# Patient Record
Sex: MALE | Race: BLACK OR AFRICAN AMERICAN | NOT HISPANIC OR LATINO | Employment: FULL TIME | URBAN - METROPOLITAN AREA
[De-identification: names, ages, dates, MRNs, and addresses within clinical notes are randomized per-mention and may not be internally consistent; named-entity substitution may affect disease eponyms.]

---

## 2024-03-13 ENCOUNTER — HOSPITAL ENCOUNTER (OUTPATIENT)
Facility: HOSPITAL | Age: 54
Discharge: HOME | End: 2024-03-14
Attending: STUDENT IN AN ORGANIZED HEALTH CARE EDUCATION/TRAINING PROGRAM | Admitting: STUDENT IN AN ORGANIZED HEALTH CARE EDUCATION/TRAINING PROGRAM
Payer: COMMERCIAL

## 2024-03-13 DIAGNOSIS — R06.02 SHORTNESS OF BREATH: ICD-10-CM

## 2024-03-13 DIAGNOSIS — R06.02 SOB (SHORTNESS OF BREATH): ICD-10-CM

## 2024-03-13 DIAGNOSIS — R00.0 TACHYCARDIA: ICD-10-CM

## 2024-03-13 DIAGNOSIS — T78.3XXA ANGIOEDEMA: Primary | ICD-10-CM

## 2024-03-13 PROBLEM — I10 ESSENTIAL HYPERTENSION: Status: ACTIVE | Noted: 2024-03-13

## 2024-03-13 PROBLEM — E11.9 TYPE 2 DIABETES MELLITUS, WITHOUT LONG-TERM CURRENT USE OF INSULIN: Status: ACTIVE | Noted: 2024-03-13

## 2024-03-13 LAB
ALBUMIN SERPL BCP-MCNC: 4.1 G/DL (ref 3.5–5.2)
ALP SERPL-CCNC: 90 U/L (ref 55–135)
ALT SERPL W/O P-5'-P-CCNC: 34 U/L (ref 10–44)
ANION GAP SERPL CALC-SCNC: 10 MMOL/L (ref 8–16)
AST SERPL-CCNC: 30 U/L (ref 10–40)
BASOPHILS # BLD AUTO: 0.03 K/UL (ref 0–0.2)
BASOPHILS NFR BLD: 0.3 % (ref 0–1.9)
BILIRUB SERPL-MCNC: 0.6 MG/DL (ref 0.1–1)
BUN SERPL-MCNC: 17 MG/DL (ref 6–20)
CALCIUM SERPL-MCNC: 10.3 MG/DL (ref 8.7–10.5)
CHLORIDE SERPL-SCNC: 105 MMOL/L (ref 95–110)
CO2 SERPL-SCNC: 21 MMOL/L (ref 23–29)
CREAT SERPL-MCNC: 1.3 MG/DL (ref 0.5–1.4)
DIFFERENTIAL METHOD BLD: ABNORMAL
EOSINOPHIL # BLD AUTO: 0.1 K/UL (ref 0–0.5)
EOSINOPHIL NFR BLD: 1 % (ref 0–8)
ERYTHROCYTE [DISTWIDTH] IN BLOOD BY AUTOMATED COUNT: 13.5 % (ref 11.5–14.5)
EST. GFR  (NO RACE VARIABLE): >60 ML/MIN/1.73 M^2
GLUCOSE SERPL-MCNC: 197 MG/DL (ref 70–110)
HCT VFR BLD AUTO: 47.3 % (ref 40–54)
HCV AB SERPL QL IA: NORMAL
HGB BLD-MCNC: 16 G/DL (ref 14–18)
HIV 1+2 AB+HIV1 P24 AG SERPL QL IA: NORMAL
IMM GRANULOCYTES # BLD AUTO: 0.04 K/UL (ref 0–0.04)
IMM GRANULOCYTES NFR BLD AUTO: 0.4 % (ref 0–0.5)
LYMPHOCYTES # BLD AUTO: 2.1 K/UL (ref 1–4.8)
LYMPHOCYTES NFR BLD: 20.9 % (ref 18–48)
MCH RBC QN AUTO: 26.4 PG (ref 27–31)
MCHC RBC AUTO-ENTMCNC: 33.8 G/DL (ref 32–36)
MCV RBC AUTO: 78 FL (ref 82–98)
MONOCYTES # BLD AUTO: 1 K/UL (ref 0.3–1)
MONOCYTES NFR BLD: 9.6 % (ref 4–15)
NEUTROPHILS # BLD AUTO: 6.8 K/UL (ref 1.8–7.7)
NEUTROPHILS NFR BLD: 67.8 % (ref 38–73)
NRBC BLD-RTO: 0 /100 WBC
OHS QRS DURATION: 86 MS
OHS QTC CALCULATION: 437 MS
PLATELET # BLD AUTO: 267 K/UL (ref 150–450)
PMV BLD AUTO: 9.8 FL (ref 9.2–12.9)
POCT GLUCOSE: 193 MG/DL (ref 70–110)
POCT GLUCOSE: 313 MG/DL (ref 70–110)
POTASSIUM SERPL-SCNC: 4.5 MMOL/L (ref 3.5–5.1)
PROT SERPL-MCNC: 8.6 G/DL (ref 6–8.4)
RBC # BLD AUTO: 6.05 M/UL (ref 4.6–6.2)
SODIUM SERPL-SCNC: 136 MMOL/L (ref 136–145)
TROPONIN I SERPL DL<=0.01 NG/ML-MCNC: 0.01 NG/ML (ref 0–0.03)
WBC # BLD AUTO: 10.07 K/UL (ref 3.9–12.7)

## 2024-03-13 PROCEDURE — 93010 ELECTROCARDIOGRAM REPORT: CPT | Mod: ,,, | Performed by: INTERNAL MEDICINE

## 2024-03-13 PROCEDURE — 82962 GLUCOSE BLOOD TEST: CPT

## 2024-03-13 PROCEDURE — 86803 HEPATITIS C AB TEST: CPT | Performed by: PHYSICIAN ASSISTANT

## 2024-03-13 PROCEDURE — 87389 HIV-1 AG W/HIV-1&-2 AB AG IA: CPT | Performed by: PHYSICIAN ASSISTANT

## 2024-03-13 PROCEDURE — 25000003 PHARM REV CODE 250: Performed by: PHYSICIAN ASSISTANT

## 2024-03-13 PROCEDURE — 25000003 PHARM REV CODE 250: Performed by: STUDENT IN AN ORGANIZED HEALTH CARE EDUCATION/TRAINING PROGRAM

## 2024-03-13 PROCEDURE — 63600175 PHARM REV CODE 636 W HCPCS: Performed by: PHYSICIAN ASSISTANT

## 2024-03-13 PROCEDURE — 99285 EMERGENCY DEPT VISIT HI MDM: CPT | Mod: 25

## 2024-03-13 PROCEDURE — 93005 ELECTROCARDIOGRAM TRACING: CPT

## 2024-03-13 PROCEDURE — 85025 COMPLETE CBC W/AUTO DIFF WBC: CPT | Performed by: STUDENT IN AN ORGANIZED HEALTH CARE EDUCATION/TRAINING PROGRAM

## 2024-03-13 PROCEDURE — G0378 HOSPITAL OBSERVATION PER HR: HCPCS

## 2024-03-13 PROCEDURE — 96375 TX/PRO/DX INJ NEW DRUG ADDON: CPT

## 2024-03-13 PROCEDURE — 63600175 PHARM REV CODE 636 W HCPCS: Performed by: STUDENT IN AN ORGANIZED HEALTH CARE EDUCATION/TRAINING PROGRAM

## 2024-03-13 PROCEDURE — 80053 COMPREHEN METABOLIC PANEL: CPT | Performed by: STUDENT IN AN ORGANIZED HEALTH CARE EDUCATION/TRAINING PROGRAM

## 2024-03-13 PROCEDURE — 84484 ASSAY OF TROPONIN QUANT: CPT | Performed by: STUDENT IN AN ORGANIZED HEALTH CARE EDUCATION/TRAINING PROGRAM

## 2024-03-13 PROCEDURE — 96374 THER/PROPH/DIAG INJ IV PUSH: CPT

## 2024-03-13 PROCEDURE — 96372 THER/PROPH/DIAG INJ SC/IM: CPT | Mod: 59 | Performed by: PHYSICIAN ASSISTANT

## 2024-03-13 PROCEDURE — 96361 HYDRATE IV INFUSION ADD-ON: CPT

## 2024-03-13 RX ORDER — ACETAMINOPHEN 500 MG
1000 TABLET ORAL EVERY 8 HOURS PRN
Status: DISCONTINUED | OUTPATIENT
Start: 2024-03-13 | End: 2024-03-14 | Stop reason: HOSPADM

## 2024-03-13 RX ORDER — IPRATROPIUM BROMIDE AND ALBUTEROL SULFATE 2.5; .5 MG/3ML; MG/3ML
3 SOLUTION RESPIRATORY (INHALATION) EVERY 4 HOURS PRN
Status: DISCONTINUED | OUTPATIENT
Start: 2024-03-13 | End: 2024-03-14 | Stop reason: HOSPADM

## 2024-03-13 RX ORDER — SODIUM CHLORIDE 0.9 % (FLUSH) 0.9 %
5 SYRINGE (ML) INJECTION
Status: DISCONTINUED | OUTPATIENT
Start: 2024-03-13 | End: 2024-03-14 | Stop reason: HOSPADM

## 2024-03-13 RX ORDER — IBUPROFEN 200 MG
24 TABLET ORAL
Status: DISCONTINUED | OUTPATIENT
Start: 2024-03-13 | End: 2024-03-14 | Stop reason: HOSPADM

## 2024-03-13 RX ORDER — BISACODYL 10 MG/1
10 SUPPOSITORY RECTAL DAILY PRN
Status: DISCONTINUED | OUTPATIENT
Start: 2024-03-13 | End: 2024-03-14 | Stop reason: HOSPADM

## 2024-03-13 RX ORDER — AMLODIPINE BESYLATE 10 MG/1
10 TABLET ORAL DAILY
Status: DISCONTINUED | OUTPATIENT
Start: 2024-03-13 | End: 2024-03-14

## 2024-03-13 RX ORDER — ROSUVASTATIN CALCIUM 40 MG/1
40 TABLET, COATED ORAL DAILY
COMMUNITY
Start: 2024-02-25

## 2024-03-13 RX ORDER — INSULIN ASPART 100 [IU]/ML
0-5 INJECTION, SOLUTION INTRAVENOUS; SUBCUTANEOUS
Status: DISCONTINUED | OUTPATIENT
Start: 2024-03-13 | End: 2024-03-14 | Stop reason: HOSPADM

## 2024-03-13 RX ORDER — GLUCAGON 1 MG
1 KIT INJECTION
Status: DISCONTINUED | OUTPATIENT
Start: 2024-03-13 | End: 2024-03-14 | Stop reason: HOSPADM

## 2024-03-13 RX ORDER — DIPHENHYDRAMINE HYDROCHLORIDE 50 MG/ML
25 INJECTION INTRAMUSCULAR; INTRAVENOUS
Status: COMPLETED | OUTPATIENT
Start: 2024-03-13 | End: 2024-03-13

## 2024-03-13 RX ORDER — ONDANSETRON 8 MG/1
8 TABLET, ORALLY DISINTEGRATING ORAL EVERY 8 HOURS PRN
Status: DISCONTINUED | OUTPATIENT
Start: 2024-03-13 | End: 2024-03-14 | Stop reason: HOSPADM

## 2024-03-13 RX ORDER — ACETAMINOPHEN 325 MG/1
650 TABLET ORAL EVERY 4 HOURS PRN
Status: DISCONTINUED | OUTPATIENT
Start: 2024-03-13 | End: 2024-03-14 | Stop reason: HOSPADM

## 2024-03-13 RX ORDER — SIMETHICONE 80 MG
1 TABLET,CHEWABLE ORAL 4 TIMES DAILY PRN
Status: DISCONTINUED | OUTPATIENT
Start: 2024-03-13 | End: 2024-03-14 | Stop reason: HOSPADM

## 2024-03-13 RX ORDER — ALUMINUM HYDROXIDE, MAGNESIUM HYDROXIDE, AND SIMETHICONE 1200; 120; 1200 MG/30ML; MG/30ML; MG/30ML
30 SUSPENSION ORAL 4 TIMES DAILY PRN
Status: DISCONTINUED | OUTPATIENT
Start: 2024-03-13 | End: 2024-03-14 | Stop reason: HOSPADM

## 2024-03-13 RX ORDER — IBUPROFEN 200 MG
16 TABLET ORAL
Status: DISCONTINUED | OUTPATIENT
Start: 2024-03-13 | End: 2024-03-14 | Stop reason: HOSPADM

## 2024-03-13 RX ORDER — METFORMIN HYDROCHLORIDE 500 MG/1
500 TABLET ORAL 2 TIMES DAILY WITH MEALS
COMMUNITY
Start: 2024-02-25

## 2024-03-13 RX ORDER — DEXAMETHASONE SODIUM PHOSPHATE 4 MG/ML
10 INJECTION, SOLUTION INTRA-ARTICULAR; INTRALESIONAL; INTRAMUSCULAR; INTRAVENOUS; SOFT TISSUE
Status: COMPLETED | OUTPATIENT
Start: 2024-03-13 | End: 2024-03-13

## 2024-03-13 RX ORDER — PROCHLORPERAZINE EDISYLATE 5 MG/ML
5 INJECTION INTRAMUSCULAR; INTRAVENOUS EVERY 6 HOURS PRN
Status: DISCONTINUED | OUTPATIENT
Start: 2024-03-13 | End: 2024-03-14 | Stop reason: HOSPADM

## 2024-03-13 RX ORDER — TALC
6 POWDER (GRAM) TOPICAL NIGHTLY PRN
Status: DISCONTINUED | OUTPATIENT
Start: 2024-03-13 | End: 2024-03-14 | Stop reason: HOSPADM

## 2024-03-13 RX ORDER — AMLODIPINE BESYLATE 10 MG/1
10 TABLET ORAL DAILY
Status: ON HOLD | COMMUNITY
Start: 2024-02-25 | End: 2024-03-14 | Stop reason: HOSPADM

## 2024-03-13 RX ORDER — FAMOTIDINE 10 MG/ML
20 INJECTION INTRAVENOUS
Status: COMPLETED | OUTPATIENT
Start: 2024-03-13 | End: 2024-03-13

## 2024-03-13 RX ADMIN — SODIUM CHLORIDE 1000 ML: 9 INJECTION, SOLUTION INTRAVENOUS at 07:03

## 2024-03-13 RX ADMIN — AMLODIPINE BESYLATE 10 MG: 10 TABLET ORAL at 10:03

## 2024-03-13 RX ADMIN — INSULIN ASPART 2 UNITS: 100 INJECTION, SOLUTION INTRAVENOUS; SUBCUTANEOUS at 09:03

## 2024-03-13 RX ADMIN — DIPHENHYDRAMINE HYDROCHLORIDE 25 MG: 50 INJECTION, SOLUTION INTRAMUSCULAR; INTRAVENOUS at 07:03

## 2024-03-13 RX ADMIN — FAMOTIDINE 20 MG: 10 INJECTION, SOLUTION INTRAVENOUS at 07:03

## 2024-03-13 RX ADMIN — DEXAMETHASONE SODIUM PHOSPHATE 10 MG: 4 INJECTION INTRA-ARTICULAR; INTRALESIONAL; INTRAMUSCULAR; INTRAVENOUS; SOFT TISSUE at 07:03

## 2024-03-13 NOTE — H&P
"[facility]  History & Physical    Patient Name: Abram Carrasco  MRN: [id number]  Patient Class: OP- Observation  Admission Date: 3/13/2024  Attending Physician: Manuel Fox MD   Primary Care Provider: [facility]         Patient information was obtained from patient, past medical records, and ER records.     Subjective:     Principal Problem:Angioedema    Chief Complaint:   Chief Complaint   Patient presents with    Facial Swelling     Patient reports that he takes lisinopril. Patient reports that he is having SOB. Patient states that he noticed the swelling last night, but states that is worsened today.        HPI: Patient is a 53 year old male with history of HTN and DM2 being admitted to observation for angioedema. Patient presents with lower lip swelling that began this morning. He says it was initially to the lower right corner of his lip and progressively worsened to the entire lower lip. No trouble speaking, swallowing. Can eat and drink. Says he sometimes felt SOB but not "really." Took a benadryl prior to arrival. No nausea, vomiting or abdominal pain, or diarrhea. No recent medication changes, diet changes. No rash. No new habits/foods, etc. No family history of angioedema. He does take lisinopril and has for years.    In the ED, /102. Intake labs without acute abnormality. Patient received Dexamethasone 10mg IV, Benadryl 25mg IV, Famotidine 20mg IV, and 1L NS.    No past medical history on file.    No past surgical history on file.    Review of patient's allergies indicates:   Allergen Reactions    Lisinopril Swelling     Angioedema       No current facility-administered medications on file prior to encounter.     Current Outpatient Medications on File Prior to Encounter   Medication Sig    amLODIPine (NORVASC) 10 MG tablet Take 10 mg by mouth once daily.    metFORMIN (GLUCOPHAGE) 500 MG tablet Take 500 mg by mouth 2 (two) times daily with meals. "    rosuvastatin (CRESTOR) 40 MG Tab Take 40 mg by mouth once daily.     Family History    None       Tobacco Use    Smoking status: Not on file    Smokeless tobacco: Not on file   Substance and Sexual Activity    Alcohol use: Not on file    Drug use: Not on file    Sexual activity: Not on file     Review of Systems   Constitutional:  Negative for activity change, chills and fever.   HENT:  Positive for facial swelling. Negative for trouble swallowing.    Eyes:  Negative for photophobia and visual disturbance.   Respiratory:  Negative for chest tightness, shortness of breath and wheezing.    Cardiovascular:  Negative for chest pain, palpitations and leg swelling.   Gastrointestinal:  Negative for abdominal pain, constipation, diarrhea, nausea and vomiting.   Genitourinary:  Negative for dysuria, frequency, hematuria and urgency.   Musculoskeletal:  Negative for arthralgias, back pain and gait problem.   Skin:  Negative for color change and rash.   Neurological:  Negative for dizziness, syncope, weakness, light-headedness, numbness and headaches.   Psychiatric/Behavioral:  Negative for agitation and confusion. The patient is not nervous/anxious.      Objective:     Vital Signs (Most Recent):  Temp: 98.4 °F (36.9 °C) (03/13/24 0629)  Pulse: 76 (03/13/24 0832)  Resp: 18 (03/13/24 0832)  BP: (!) 160/90 (03/13/24 0832)  SpO2: 98 % (03/13/24 0832) Vital Signs (24h Range):  Temp:  [98.4 °F (36.9 °C)] 98.4 °F (36.9 °C)  Pulse:  [] 76  Resp:  [18] 18  SpO2:  [98 %-99 %] 98 %  BP: (138-160)/() 160/90     Weight: 97.5 kg (215 lb)  Body mass index is 33.67 kg/m².     Physical Exam  Vitals and nursing note reviewed.   Constitutional:       General: He is not in acute distress.     Appearance: He is well-developed.   HENT:      Head: Normocephalic and atraumatic.      Mouth/Throat:      Mouth: Angioedema present.      Pharynx: No oropharyngeal exudate.   Eyes:      Conjunctiva/sclera: Conjunctivae normal.      Pupils:  Pupils are equal, round, and reactive to light.   Cardiovascular:      Rate and Rhythm: Normal rate and regular rhythm.      Heart sounds: Normal heart sounds.   Pulmonary:      Effort: Pulmonary effort is normal. No respiratory distress.      Breath sounds: Normal breath sounds. No wheezing.   Abdominal:      General: Bowel sounds are normal. There is no distension.      Palpations: Abdomen is soft.      Tenderness: There is no abdominal tenderness.   Musculoskeletal:         General: No tenderness. Normal range of motion.      Cervical back: Normal range of motion and neck supple.   Lymphadenopathy:      Cervical: No cervical adenopathy.   Skin:     General: Skin is warm and dry.      Capillary Refill: Capillary refill takes less than 2 seconds.      Findings: No rash.   Neurological:      Mental Status: He is alert and oriented to person, place, and time.      Cranial Nerves: No cranial nerve deficit.      Sensory: No sensory deficit.      Coordination: Coordination normal.   Psychiatric:         Behavior: Behavior normal.         Thought Content: Thought content normal.         Judgment: Judgment normal.              CRANIAL NERVES     CN III, IV, VI   Pupils are equal, round, and reactive to light.       Significant Labs: All pertinent labs within the past 24 hours have been reviewed.  BMP:   Recent Labs   Lab 03/13/24  0738   *      K 4.5      CO2 21*   BUN 17   CREATININE 1.3   CALCIUM 10.3     CBC:   Recent Labs   Lab 03/13/24  0738   WBC 10.07   HGB 16.0   HCT 47.3          Significant Imaging: I have reviewed all pertinent imaging results/findings within the past 24 hours.  Assessment/Plan:     * Angioedema  Patient presents with acute onset of lip swelling. History of ACE usage.  - stop Lisinopril, added to allergy list, discussed with patient  - given Dexamethasone 10mg IV, Benadryl 25mg, and Famotidine 20  - monitor for improvement  - can give prednisone tomorrow if  necessary  - monitor BG in setting of DM2      Type 2 diabetes mellitus, without long-term current use of insulin  - glucose 197 on admission  - hold home Metformin  - diabetic diet  - low dose SSI in setting of steroid usage    Essential hypertension  Chronic, uncontrolled. Latest blood pressure and vitals reviewed-     Temp:  [98.4 °F (36.9 °C)]   Pulse:  []   Resp:  [18]   BP: (138-160)/()   SpO2:  [98 %-99 %] .   Home meds for hypertension were reviewed and noted below.   Hypertension Medications               amLODIPine (NORVASC) 10 MG tablet Take 10 mg by mouth once daily.            While in the hospital, will manage blood pressure as follows; Continue home antihypertensive regimen    Will utilize p.r.n. blood pressure medication only if patient's blood pressure greater than 180/110 and he develops symptoms such as worsening chest pain or shortness of breath.  - STOP Lisinopril      VTE Risk Mitigation (From admission, onward)           Ordered     IP VTE LOW RISK PATIENT  Once         03/13/24 0900     Place sequential compression device  Until discontinued         03/13/24 0900                         On 03/13/2024, patient should be placed in hospital observation services under my care in collaboration with Dr. Manuel Fox.           Storm Regan PA-C  Department of Hospital Medicine  [facility] - [facility] Dept

## 2024-03-13 NOTE — ED NOTES
Pt. Rounded on. Pain addressed, patient in no active pain. Patient repositioned in bed. Pt. Resting comfortably in bed with both side rails up. Pt. Has call bell and all belongings in reach at bedside. Patient asked if they needed to go to the restroom. Patient updated on treatment plan, patient verbalized understanding. Will continue to monitor patient at this time.

## 2024-03-13 NOTE — ED NOTES
Report received from Suyapa Carl RN. Assume care of pt. Pt resting comfortably and independently repositioned in stretcher with bed locked in lowest position for safety. NAD noted at this time. Respirations even and unlabored and visible chest rise noted.  Patient offered bathroom assistance and denies need at this time. Pt instructed to call if assistance is needed. Pt on continuous cardiac, BP, and O2 monitoring. Call light within reach. Family at bedside. No needs at this time. Will continue to monitor.

## 2024-03-13 NOTE — ED NOTES
Pt placed on cardiac monitor, continuous pulse ox, cycling blood pressures. Side rails up x2, call bell in reach, bed in low position with brake engaged.  AAOX4, skin w/d resp wnl- offers no c/o's at this time. Lower lip remains swollen, no  resp distress noted or difficulty swallowing.  IV NS infusing at bolus rate- site asymptomatic, Call bell in reach    LOC: The patient is awake and alert; oriented x 3 and speaking appropriately.  APPEARANCE: Patient resting comfortably, patient is clean and well groomed  SKIN: warm and dry, normal skin turgor & moist mucus membranes, skin intact, no breakdown noted.Lower lip swollen  MUSCULOSKELETAL: Patient moving all extremities well, no obvious swelling or deformities noted  RESPIRATORY: Airway is open and patent,  respirations are spontaneous, normal effort and rate  CARDIAC: Patient has a normal rate, no peripheral edema noted, capillary refill < 3 seconds; No complaints of chest pain   ABDOMEN: Soft and non tender to palpation, no distention noted. Bowel sounds present x 4

## 2024-03-13 NOTE — HPI
"Patient is a 53 year old male with history of HTN and DM2 being admitted to observation for angioedema. Patient presents with lower lip swelling that began this morning. He says it was initially to the lower right corner of his lip and progressively worsened to the entire lower lip. No trouble speaking, swallowing. Can eat and drink. Says he sometimes felt SOB but not "really." Took a benadryl prior to arrival. No nausea, vomiting or abdominal pain, or diarrhea. No recent medication changes, diet changes. No rash. No new habits/foods, etc. No family history of angioedema. He does take lisinopril and has for years.    In the ED, /102. Intake labs without acute abnormality. Patient received Dexamethasone 10mg IV, Benadryl 25mg IV, Famotidine 20mg IV, and 1L NS.  "

## 2024-03-13 NOTE — SUBJECTIVE & OBJECTIVE
No past medical history on file.    No past surgical history on file.    Review of patient's allergies indicates:   Allergen Reactions    Lisinopril Swelling     Angioedema       No current facility-administered medications on file prior to encounter.     Current Outpatient Medications on File Prior to Encounter   Medication Sig    amLODIPine (NORVASC) 10 MG tablet Take 10 mg by mouth once daily.    metFORMIN (GLUCOPHAGE) 500 MG tablet Take 500 mg by mouth 2 (two) times daily with meals.    rosuvastatin (CRESTOR) 40 MG Tab Take 40 mg by mouth once daily.     Family History    None       Tobacco Use    Smoking status: Not on file    Smokeless tobacco: Not on file   Substance and Sexual Activity    Alcohol use: Not on file    Drug use: Not on file    Sexual activity: Not on file     Review of Systems   Constitutional:  Negative for activity change, chills and fever.   HENT:  Positive for facial swelling. Negative for trouble swallowing.    Eyes:  Negative for photophobia and visual disturbance.   Respiratory:  Negative for chest tightness, shortness of breath and wheezing.    Cardiovascular:  Negative for chest pain, palpitations and leg swelling.   Gastrointestinal:  Negative for abdominal pain, constipation, diarrhea, nausea and vomiting.   Genitourinary:  Negative for dysuria, frequency, hematuria and urgency.   Musculoskeletal:  Negative for arthralgias, back pain and gait problem.   Skin:  Negative for color change and rash.   Neurological:  Negative for dizziness, syncope, weakness, light-headedness, numbness and headaches.   Psychiatric/Behavioral:  Negative for agitation and confusion. The patient is not nervous/anxious.      Objective:     Vital Signs (Most Recent):  Temp: 98.4 °F (36.9 °C) (03/13/24 0629)  Pulse: 76 (03/13/24 0832)  Resp: 18 (03/13/24 0832)  BP: (!) 160/90 (03/13/24 0832)  SpO2: 98 % (03/13/24 0832) Vital Signs (24h Range):  Temp:  [98.4 °F (36.9 °C)] 98.4 °F (36.9 °C)  Pulse:  []  76  Resp:  [18] 18  SpO2:  [98 %-99 %] 98 %  BP: (138-160)/() 160/90     Weight: 97.5 kg (215 lb)  Body mass index is 33.67 kg/m².     Physical Exam  Vitals and nursing note reviewed.   Constitutional:       General: He is not in acute distress.     Appearance: He is well-developed.   HENT:      Head: Normocephalic and atraumatic.      Mouth/Throat:      Mouth: Angioedema present.      Pharynx: No oropharyngeal exudate.   Eyes:      Conjunctiva/sclera: Conjunctivae normal.      Pupils: Pupils are equal, round, and reactive to light.   Cardiovascular:      Rate and Rhythm: Normal rate and regular rhythm.      Heart sounds: Normal heart sounds.   Pulmonary:      Effort: Pulmonary effort is normal. No respiratory distress.      Breath sounds: Normal breath sounds. No wheezing.   Abdominal:      General: Bowel sounds are normal. There is no distension.      Palpations: Abdomen is soft.      Tenderness: There is no abdominal tenderness.   Musculoskeletal:         General: No tenderness. Normal range of motion.      Cervical back: Normal range of motion and neck supple.   Lymphadenopathy:      Cervical: No cervical adenopathy.   Skin:     General: Skin is warm and dry.      Capillary Refill: Capillary refill takes less than 2 seconds.      Findings: No rash.   Neurological:      Mental Status: He is alert and oriented to person, place, and time.      Cranial Nerves: No cranial nerve deficit.      Sensory: No sensory deficit.      Coordination: Coordination normal.   Psychiatric:         Behavior: Behavior normal.         Thought Content: Thought content normal.         Judgment: Judgment normal.              CRANIAL NERVES     CN III, IV, VI   Pupils are equal, round, and reactive to light.       Significant Labs: All pertinent labs within the past 24 hours have been reviewed.  BMP:   Recent Labs   Lab 03/13/24  0738   *      K 4.5      CO2 21*   BUN 17   CREATININE 1.3   CALCIUM 10.3     CBC:    Recent Labs   Lab 03/13/24  0738   WBC 10.07   HGB 16.0   HCT 47.3          Significant Imaging: I have reviewed all pertinent imaging results/findings within the past 24 hours.

## 2024-03-13 NOTE — ASSESSMENT & PLAN NOTE
Chronic, uncontrolled. Latest blood pressure and vitals reviewed-     Temp:  [98.4 °F (36.9 °C)]   Pulse:  []   Resp:  [18]   BP: (138-160)/()   SpO2:  [98 %-99 %] .   Home meds for hypertension were reviewed and noted below.   Hypertension Medications               amLODIPine (NORVASC) 10 MG tablet Take 10 mg by mouth once daily.            While in the hospital, will manage blood pressure as follows; Continue home antihypertensive regimen    Will utilize p.r.n. blood pressure medication only if patient's blood pressure greater than 180/110 and he develops symptoms such as worsening chest pain or shortness of breath.  - STOP Lisinopril

## 2024-03-13 NOTE — ASSESSMENT & PLAN NOTE
- glucose 197 on admission  - hold home Metformin  - diabetic diet  - low dose SSI in setting of steroid usage

## 2024-03-13 NOTE — ED PROVIDER NOTES
"Encounter Date: 3/13/2024       History     Chief Complaint   Patient presents with    Facial Swelling     Patient reports that he takes lisinopril. Patient reports that he is having SOB. Patient states that he noticed the swelling last night, but states that is worsened today.     53 year old male with history of HTN presents with lower lip swelling that began this morning. He says it was initially to the lower right corner of his lip and progressively worsened to the entire lower lip. No trouble speaking, swallowing. Can eat and drink. Says he sometimes felt SOB but not "really." Took a benadryl prior to arrival. No nausea, vomiting or abdominal pain, or diarrhea. No recent medication changes, diet changes. No rash. No new habits/foods, etc. No family history of angioedema.       Review of patient's allergies indicates:   Allergen Reactions    Lisinopril Swelling     Angioedema     No past medical history on file.  No past surgical history on file.  No family history on file.     Review of Systems    Physical Exam     Initial Vitals [03/13/24 0629]   BP Pulse Resp Temp SpO2   (!) 138/102 107 18 98.4 °F (36.9 °C) 99 %      MAP       --         Physical Exam    Nursing note and vitals reviewed.  Constitutional: He appears well-developed and well-nourished.   HENT:   Head: Normocephalic and atraumatic.   Lower lip angioedema, uvula is midline. Phonating.    Eyes: EOM are normal. Pupils are equal, round, and reactive to light.   Neck: Neck supple.   Normal range of motion.  Cardiovascular:  Normal rate and regular rhythm.           Pulmonary/Chest: Breath sounds normal. No respiratory distress.   Abdominal: Abdomen is soft. There is no abdominal tenderness.   Musculoskeletal:         General: No tenderness or edema. Normal range of motion.      Cervical back: Normal range of motion and neck supple.     Neurological: He is alert and oriented to person, place, and time. GCS score is 15. GCS eye subscore is 4. GCS verbal " subscore is 5. GCS motor subscore is 6.   Skin: Skin is warm and dry. Capillary refill takes less than 2 seconds.   Psychiatric: He has a normal mood and affect. Thought content normal.         ED Course   Procedures  Labs Reviewed   CBC W/ AUTO DIFFERENTIAL - Abnormal; Notable for the following components:       Result Value    MCV 78 (*)     MCH 26.4 (*)     All other components within normal limits    Narrative:     Release to patient->Immediate   COMPREHENSIVE METABOLIC PANEL - Abnormal; Notable for the following components:    CO2 21 (*)     Glucose 197 (*)     Total Protein 8.6 (*)     All other components within normal limits    Narrative:     Release to patient->Immediate   POCT GLUCOSE - Abnormal; Notable for the following components:    POCT Glucose 193 (*)     All other components within normal limits   HIV 1 / 2 ANTIBODY    Narrative:     Release to patient->Immediate   HEPATITIS C ANTIBODY    Narrative:     Release to patient->Immediate   TROPONIN I    Narrative:     Release to patient->Immediate   POCT GLUCOSE MONITORING CONTINUOUS        ECG Results              EKG 12-lead (Final result)        Collection Time Result Time QRS Duration OHS QTC Calculation    03/13/24 06:33:09 03/13/24 09:55:30 86 437                     Final result by Interface, Lab In [facility] (03/13/24 09:55:34)                   Narrative:    Test Reason : R06.02,    Vent. Rate : 096 BPM     Atrial Rate : 096 BPM     P-R Int : 146 ms          QRS Dur : 086 ms      QT Int : 346 ms       P-R-T Axes : 067 -19 014 degrees     QTc Int : 437 ms    Normal sinus rhythm  Nonspecific T wave abnormality  Abnormal ECG  When compared with ECG of 03-JAN-2005 16:09,  No significant change was found  Confirmed by Pankaj VERGARA MD ([id number]) on 3/13/2024 9:55:28 AM    Referred By: JOHNNIE   SELF           Confirmed By:Pankaj VERGARA MD                                  Imaging Results              X-Ray Chest AP Portable (Final result)  Result time 03/13/24  "08:52:43      Final result by Elver Dao MD (03/13/24 08:52:43)                   Impression:      No acute cardiopulmonary finding identified on this single view.      Electronically signed by: Elver Dao MD  Date:    03/13/2024  Time:    08:52               Narrative:    EXAMINATION:  XR CHEST AP PORTABLE    CLINICAL HISTORY:  Provided history is "  Shortness of breath".    TECHNIQUE:  One view of the chest.    COMPARISON:  01/03/2005.    FINDINGS:  Cardiac wires overlie the chest.  Cardiac silhouette is not enlarged.  No focal consolidation.  No sizable pleural effusion.  No pneumothorax.                                       Medications   sodium chloride 0.9% flush 5 mL (has no administration in time range)   albuterol-ipratropium 2.5 mg-0.5 mg/3 mL nebulizer solution 3 mL (has no administration in time range)   melatonin tablet 6 mg (has no administration in time range)   ondansetron disintegrating tablet 8 mg (has no administration in time range)   prochlorperazine injection Soln 5 mg (has no administration in time range)   bisacodyL suppository 10 mg (has no administration in time range)   simethicone chewable tablet 80 mg (has no administration in time range)   aluminum-magnesium hydroxide-simethicone 200-200-20 mg/5 mL suspension 30 mL (has no administration in time range)   acetaminophen tablet 650 mg (has no administration in time range)   acetaminophen tablet 1,000 mg (has no administration in time range)   amLODIPine tablet 10 mg (10 mg Oral Given 3/13/24 1011)   glucose chewable tablet 16 g (has no administration in time range)   glucose chewable tablet 24 g (has no administration in time range)   glucagon (human recombinant) injection 1 mg (has no administration in time range)   insulin aspart U-100 pen 0-5 Units (0 Units Subcutaneous Not Given 3/13/24 1013)   dexAMETHasone injection 10 mg (10 mg Intravenous Given 3/13/24 0750)   diphenhydrAMINE injection 25 mg (25 mg Intravenous Given " 3/13/24 0750)   famotidine (PF) injection 20 mg (20 mg Intravenous Given 3/13/24 0750)   sodium chloride 0.9% bolus 1,000 mL 1,000 mL (0 mLs Intravenous Stopped 3/13/24 0849)     Medical Decision Making  Hemodynamically stable. Afebrile. Phonating and protecting the airway spontaneously. No clinical evidence for cardiovascular instability or impending airway compromise. Examination as above. Prior medical records reviewed. PMD note reviewed, hx of DM/HTN. Current co-morbidities considered that will impact clinical decision making include as above.    Plan:  Labs, supportive care, admission.       Amount and/or Complexity of Data Reviewed  Labs: ordered.  Radiology: ordered.    Risk  Prescription drug management.                        Labs reviewed.  CBC negative.  CMP negative.  Will admit to hospital medicine for further evaluation observation.              Clinical Impression:  Final diagnoses:  [R06.02] Shortness of breath  [R06.02] SOB (shortness of breath)  [T78.3XXA] Angioedema          ED Disposition Condition    Observation                 Rickey Hartmann MD  03/13/24 1856

## 2024-03-13 NOTE — ASSESSMENT & PLAN NOTE
Patient presents with acute onset of lip swelling. History of ACE usage.  - stop Lisinopril, added to allergy list, discussed with patient  - given Dexamethasone 10mg IV, Benadryl 25mg, and Famotidine 20  - monitor for improvement  - can give prednisone tomorrow if necessary  - monitor BG in setting of DM2

## 2024-03-13 NOTE — ED NOTES
Patient identifiers for Abram Carrasco 53 y.o. male checked and correct.  Chief Complaint   Patient presents with    Facial Swelling     Patient reports that he takes lisinopril. Patient reports that he is having SOB. Patient states that he noticed the swelling last night, but states that is worsened today.     No past medical history on file.  Allergies reported: Review of patient's allergies indicates:  Not on File    Pt awoke at 3am with lip selling, takes 10mg Lisinopril once a day for 10 years.     LOC: Patient is awake, alert, and aware of environment with an appropriate affect. Patient is oriented x 4 and speaking appropriately.  APPEARANCE: Patient resting comfortably and in no acute distress. Patient is clean and well groomed, patient's clothing is properly fastened.  HEENT: swelling noted to bottom lip only  SKIN: The skin is warm and dry. Patient has normal skin turgor and moist mucus membranes.   MUSKULOSKELETAL: Patient is moving all extremities well, no obvious deformities noted. Pulses intact.   RESPIRATORY: Airway is open and patent. Respirations are spontaneous and non-labored with normal effort and rate.  CARDIAC: Patient tachycardic, 107 on cardiac monitor. No peripheral edema noted.   ABDOMEN: No distention noted. Soft and non-tender upon palpation.  NEUROLOGICAL: pupils 3mm, PERRL. Facial expression is symmetrical. Hand grasps are equal bilaterally. Normal sensation in all extremities when touched with finger.

## 2024-03-13 NOTE — PHARMACY MED REC
"    Admission Medication History     The home medication history was taken by Batool Murcia.    You may go to "Admission" then "Reconcile Home Medications" tabs to review and/or act upon these items.     The home medication list has been updated by the Pharmacy department.   Please read ALL comments highlighted in yellow.   Please address this information as you see fit.    Feel free to contact us if you have any questions or require assistance.        Medications listed below were obtained from: Patient/family and Analytic software- uiu  Current Outpatient Medications on File Prior to Encounter   Medication Sig    amLODIPine (NORVASC) 10 MG tablet   Take 10 mg by mouth once daily.    metFORMIN (GLUCOPHAGE) 500 MG tablet     Take 500 mg by mouth 2 (two) times daily with meals.    rosuvastatin (CRESTOR) 40 MG Tab   Take 40 mg by mouth once daily.         Batool Murcia  EXT [id number]            .          "

## 2024-03-14 VITALS
RESPIRATION RATE: 16 BRPM | DIASTOLIC BLOOD PRESSURE: 93 MMHG | SYSTOLIC BLOOD PRESSURE: 157 MMHG | HEIGHT: 67 IN | OXYGEN SATURATION: 97 % | TEMPERATURE: 98 F | WEIGHT: 215 LBS | BODY MASS INDEX: 33.74 KG/M2 | HEART RATE: 103 BPM

## 2024-03-14 LAB
OHS QRS DURATION: 84 MS
OHS QRS DURATION: 84 MS
OHS QTC CALCULATION: 442 MS
OHS QTC CALCULATION: 445 MS
POCT GLUCOSE: 203 MG/DL (ref 70–110)
POCT GLUCOSE: 220 MG/DL (ref 70–110)
POCT GLUCOSE: 323 MG/DL (ref 70–110)

## 2024-03-14 PROCEDURE — 96372 THER/PROPH/DIAG INJ SC/IM: CPT | Performed by: PHYSICIAN ASSISTANT

## 2024-03-14 PROCEDURE — 93010 ELECTROCARDIOGRAM REPORT: CPT | Mod: ,,, | Performed by: INTERNAL MEDICINE

## 2024-03-14 PROCEDURE — 93005 ELECTROCARDIOGRAM TRACING: CPT

## 2024-03-14 PROCEDURE — G0378 HOSPITAL OBSERVATION PER HR: HCPCS

## 2024-03-14 PROCEDURE — 25000003 PHARM REV CODE 250

## 2024-03-14 PROCEDURE — 63600175 PHARM REV CODE 636 W HCPCS: Performed by: PHYSICIAN ASSISTANT

## 2024-03-14 RX ORDER — NIFEDIPINE 30 MG/1
30 TABLET, EXTENDED RELEASE ORAL DAILY
Qty: 30 TABLET | Refills: 11 | Status: SHIPPED | OUTPATIENT
Start: 2024-03-15 | End: 2025-03-15

## 2024-03-14 RX ORDER — NIFEDIPINE 30 MG/1
30 TABLET, EXTENDED RELEASE ORAL DAILY
Status: DISCONTINUED | OUTPATIENT
Start: 2024-03-14 | End: 2024-03-14 | Stop reason: HOSPADM

## 2024-03-14 RX ADMIN — INSULIN ASPART 2 UNITS: 100 INJECTION, SOLUTION INTRAVENOUS; SUBCUTANEOUS at 08:03

## 2024-03-14 RX ADMIN — NIFEDIPINE 30 MG: 30 TABLET, FILM COATED, EXTENDED RELEASE ORAL at 05:03

## 2024-03-14 NOTE — NURSING
Arrived to floor via WC.  No distress noted.  Orientation to room and functions of remote, lights and bed given.  No needs voiced at this time.

## 2024-03-14 NOTE — PLAN OF CARE
Problem: Adult Inpatient Plan of Care  Goal: Plan of Care Review  Outcome: Ongoing, Progressing  Goal: Patient-Specific Goal (Individualized)  Outcome: Ongoing, Progressing  Goal: Absence of Hospital-Acquired Illness or Injury  Outcome: Ongoing, Progressing  Goal: Optimal Comfort and Wellbeing  Outcome: Ongoing, Progressing  Goal: Readiness for Transition of Care  Outcome: Ongoing, Progressing     Problem: Infection  Goal: Absence of Infection Signs and Symptoms  Outcome: Ongoing, Progressing     Problem: Diabetes Comorbidity  Goal: Blood Glucose Level Within Targeted Range  Outcome: Ongoing, Progressing     Problem: Fluid Volume Excess  Goal: Fluid Balance  Outcome: Ongoing, Progressing     Problem: Hypertension Acute  Goal: Blood Pressure Within Desired Range  Outcome: Ongoing, Progressing

## 2024-03-14 NOTE — PLAN OF CARE
[facility]  Discharge Final Note    Primary Care Provider: No, Primary Doctor    Expected Discharge Date: 3/14/2024    No future appointments.  Pt discharged home with no services.  Narciso Vogel, RN,BSN        Final Discharge Note (most recent)       Final Note - 03/14/24 1221          Final Note    Assessment Type Final Discharge Note     Anticipated Discharge Disposition Home or Self Care     Hospital Resources/Appts/Education Provided Provided patient/caregiver with written discharge plan information;Appointments scheduled and added to AVS;Post-Acute resouces added to AVS        Post-Acute Status    Discharge Delays None known at this time                     Important Message from Medicare

## 2024-03-14 NOTE — NURSING
Nurses Note -- 4 Eyes      3/13/2024   8:48 PM      Skin assessed during: Admit      [x] No Altered Skin Integrity Present    [x]Prevention Measures Documented      [] Yes- Altered Skin Integrity Present or Discovered   [] LDA Added if Not in Epic (Describe Wound)   [] New Altered Skin Integrity was Present on Admit and Documented in LDA   [] Wound Image Taken    Wound Care Consulted? No    Attending Nurse:  Alison Jacinto RN/Staff Member:   Yohana

## 2024-03-16 NOTE — DISCHARGE SUMMARY
"[facility] [address] [place] [address] Medicine  Discharge Summary      Patient Name: Abram Carrasco  MRN: [id number]  HIMANSHU: [id number]  Patient Class: OP- Observation  Admission Date: 3/13/2024  Hospital Length of Stay: 0 days  Discharge Date and Time: 3/14/2024 10:29 AM  Attending Physician: Ashlyn att. providers found   Discharging Provider: Leonardo Tamez MD  Primary Care Provider: Ashlyn, Primary Doctor  [facility] Medicine Team: [facility] Leonardo Tamez MD  Primary Care Team: [facility]    HPI:   Patient is a 53 year old male with history of HTN and DM2 being admitted to observation for angioedema. Patient presents with lower lip swelling that began this morning. He says it was initially to the lower right corner of his lip and progressively worsened to the entire lower lip. No trouble speaking, swallowing. Can eat and drink. Says he sometimes felt SOB but not "really." Took a benadryl prior to arrival. No nausea, vomiting or abdominal pain, or diarrhea. No recent medication changes, diet changes. No rash. No new habits/foods, etc. No family history of angioedema. He does take lisinopril and has for years.    In the ED, /102. Intake labs without acute abnormality. Patient received Dexamethasone 10mg IV, Benadryl 25mg IV, Famotidine 20mg IV, and 1L NS.    * No surgery found *      Hospital Course:   Patient with resolution of facial swelling day after admission.  Antihypertensives uptitrated to nifedipine.  Patient advised to avoid ACE inhibitors.  Follow up with primary care requested.      Patient deemed appropriate for discharge. I personally saw, examined, and evaluated patient prior to departure. Plan discussed with patient, who was agreeable and amenable; medications were discussed and reviewed, outpatient follow-up scheduled, ER precautions were given, all questions were answered to the patient's satisfaction, and Abram Carrasco was subsequently discharged.           Goals of Care " Treatment Preferences:  Code Status: Full Code      Consults:     No new Assessment & Plan notes have been filed under this hospital service since the last note was generated.  Service: Hospital Medicine    Final Active Diagnoses:    Diagnosis Date Noted POA    PRINCIPAL PROBLEM:  Angioedema [T78.3XXA] 03/13/2024 Yes    Essential hypertension [I10] 03/13/2024 Yes    Type 2 diabetes mellitus, without long-term current use of insulin [E11.9] 03/13/2024 Yes      Problems Resolved During this Admission:       Discharged Condition: good    Disposition: Home or Self Care    Follow Up:    Patient Instructions:      Diet Adult Regular     Notify your health care provider if you experience any of the following:  increased confusion or weakness     Notify your health care provider if you experience any of the following:  persistent dizziness, light-headedness, or visual disturbances     Notify your health care provider if you experience any of the following:  worsening rash     Notify your health care provider if you experience any of the following:  severe persistent headache     Notify your health care provider if you experience any of the following:  difficulty breathing or increased cough     Notify your health care provider if you experience any of the following:  redness, tenderness, or signs of infection (pain, swelling, redness, odor or green/yellow discharge around incision site)     Notify your health care provider if you experience any of the following:  severe uncontrolled pain     Notify your health care provider if you experience any of the following:  persistent nausea and vomiting or diarrhea     Notify your health care provider if you experience any of the following:  temperature >100.4     Activity as tolerated       Significant Diagnostic Studies: Labs: All labs within the past 24 hours have been reviewed    Pending Diagnostic Studies:       None           Medications:  Reconciled Home Medications:       Medication List        START taking these medications      NIFEdipine 30 MG (OSM) 24 hr tablet  Commonly known as: PROCARDIA-XL  Take 1 tablet (30 mg total) by mouth once daily.            CONTINUE taking these medications      metFORMIN 500 MG tablet  Commonly known as: GLUCOPHAGE  Take 500 mg by mouth 2 (two) times daily with meals.     rosuvastatin 40 MG Tab  Commonly known as: CRESTOR  Take 40 mg by mouth once daily.            STOP taking these medications      amLODIPine 10 MG tablet  Commonly known as: NORVASC              Indwelling Lines/Drains at time of discharge:   Lines/Drains/Airways       None                   Time spent on the discharge of patient: 35 minutes         Leonardo Tamez MD  Department of Hospital Medicine  [facility] - Observation 11H

## 2024-03-16 NOTE — HOSPITAL COURSE
Patient with resolution of facial swelling day after admission.  Antihypertensives uptitrated to nifedipine.  Patient advised to avoid ACE inhibitors.  Follow up with primary care requested.      Patient deemed appropriate for discharge. I personally saw, examined, and evaluated patient prior to departure. Plan discussed with patient, who was agreeable and amenable; medications were discussed and reviewed, outpatient follow-up scheduled, ER precautions were given, all questions were answered to the patient's satisfaction, and Abram Carrasco was subsequently discharged.